# Patient Record
Sex: MALE | Race: OTHER | HISPANIC OR LATINO | URBAN - METROPOLITAN AREA
[De-identification: names, ages, dates, MRNs, and addresses within clinical notes are randomized per-mention and may not be internally consistent; named-entity substitution may affect disease eponyms.]

---

## 2024-05-19 ENCOUNTER — EMERGENCY (EMERGENCY)
Facility: HOSPITAL | Age: 39
LOS: 1 days | Discharge: HOME | End: 2024-05-19
Admitting: EMERGENCY MEDICINE
Payer: SELF-PAY

## 2024-05-19 VITALS
OXYGEN SATURATION: 99 % | RESPIRATION RATE: 17 BRPM | DIASTOLIC BLOOD PRESSURE: 84 MMHG | SYSTOLIC BLOOD PRESSURE: 124 MMHG | TEMPERATURE: 98 F | HEART RATE: 59 BPM | HEIGHT: 64 IN | WEIGHT: 176.37 LBS
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
## 2024-05-19 DIAGNOSIS — S42.021A DISPLACED FRACTURE OF SHAFT OF RIGHT CLAVICLE, INITIAL ENCOUNTER FOR CLOSED FRACTURE: ICD-10-CM

## 2024-05-19 DIAGNOSIS — S22.41XA MULTIPLE FRACTURES OF RIBS, RIGHT SIDE, INITIAL ENCOUNTER FOR CLOSED FRACTURE: ICD-10-CM

## 2024-05-19 DIAGNOSIS — R07.81 PLEURODYNIA: ICD-10-CM

## 2024-05-19 PROCEDURE — 73000 X-RAY EXAM OF COLLAR BONE: CPT | Mod: 26,RT

## 2024-05-19 PROCEDURE — 71250 CT THORAX DX C-: CPT | Mod: 26,MC

## 2024-05-19 PROCEDURE — 73030 X-RAY EXAM OF SHOULDER: CPT | Mod: 26,RT

## 2024-05-19 PROCEDURE — 99285 EMERGENCY DEPT VISIT HI MDM: CPT

## 2024-05-19 RX ORDER — ACETAMINOPHEN 500 MG
650 TABLET ORAL ONCE
Refills: 0 | Status: COMPLETED | OUTPATIENT
Start: 2024-05-19 | End: 2024-05-19

## 2024-05-19 RX ORDER — KETOROLAC TROMETHAMINE 30 MG/ML
15 SYRINGE (ML) INJECTION ONCE
Refills: 0 | Status: DISCONTINUED | OUTPATIENT
Start: 2024-05-19 | End: 2024-05-19

## 2024-05-19 RX ORDER — LIDOCAINE 4 G/100G
1 CREAM TOPICAL ONCE
Refills: 0 | Status: COMPLETED | OUTPATIENT
Start: 2024-05-19 | End: 2024-05-19

## 2024-05-19 RX ADMIN — Medication 650 MILLIGRAM(S): at 19:44

## 2024-05-19 RX ADMIN — LIDOCAINE 1 PATCH: 4 CREAM TOPICAL at 19:44

## 2024-05-19 RX ADMIN — Medication 15 MILLIGRAM(S): at 19:44
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
## 2024-05-19 NOTE — ED PROVIDER NOTE - PHYSICAL EXAMINATION
· CONSTITUTIONAL: Well appearing, well nourished, awake, alert, oriented to person, place, time/situation and in no apparent distress.  · HEAD: Head atraumatic, normal cephalic shape.  · HEAD: Head is atraumatic. Head shape is symmetrical.  · CARDIAC: Normal rate, regular rhythm.  Heart sounds S1, S2.  No murmurs, rubs or gallops.  · RESPIRATORY: Breath sounds clear and equal bilaterally. +TTP Rt anterolateral rib, no deformities or signs of trauma.   · GASTROINTESTINAL: Abdomen soft, non-tender, no guarding.  · MUSCULOSKELETAL: Tenderness to palpation along the right mid clavicle.  Full range of motion of right shoulder.  Full range of motion of bilateral upper extremity, radial pulses 2+, sensation tact light touch, motion 5 out of 5 bilateral upper extremities.  · NEUROLOGICAL: Alert and oriented, no focal deficits, no motor or sensory deficits.  · SKIN: Skin normal color for race, warm, dry and intact. No evidence of rash.  · PSYCHIATRIC: Alert and oriented to person, place, time/situation. normal mood and affect. no apparent risk to self or others. · CONSTITUTIONAL: Well appearing, well nourished, awake, alert, oriented to person, place, time/situation and in no apparent distress.  · HEAD: Head atraumatic, normal cephalic shape.  · HEAD: Head is atraumatic. Head shape is symmetrical.  · CARDIAC: Normal rate, regular rhythm.  Heart sounds S1, S2.  No murmurs, rubs or gallops.  · RESPIRATORY: Breath sounds clear and equal bilaterally. +TTP Rt anterolateral rib, no deformities or signs of trauma.   · GASTROINTESTINAL: Abdomen soft, non-tender, no guarding.  · MUSCULOSKELETAL: Tenderness to palpation along the right mid clavicle.  Full range of motion of right shoulder.  Full range of motion of bilateral upper extremity, radial pulses 2+, sensation tact light touch, motion 5 out of 5 bilateral upper extremities. no e/o skin tenting, cap refill along clavicle is <3 sec.   · NEUROLOGICAL: Alert and oriented, no focal deficits, no motor or sensory deficits.  · SKIN: Skin normal color for race, warm, dry and intact. No evidence of rash.  · PSYCHIATRIC: Alert and oriented to person, place, time/situation. normal mood and affect. no apparent risk to self or others.
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
## 2024-05-19 NOTE — ED PROVIDER NOTE - NS ED ROS FT
· CONSTITUTIONAL: no fever and no chills.  · CARDIOVASCULAR: normal rate and rhythm, no chest pain and no edema.  · RESPIRATORY: no chest pain, no cough, and no shortness of breath. +RIB PAIN  · GASTROINTESTINAL: no abdominal pain, no bloating, no constipation, no diarrhea, no nausea and no vomiting.  · MUSCULOSKELETAL: no back pain, + musculoskeletal pain, no neck pain, and no weakness.  · SKIN: no abrasions, no jaundice, no lesions, no pruritis, and no rashes.  · NEURO: no loss of consciousness, no gait abnormality, no headache, no sensory deficits, and no weakness.  · PSYCHIATRIC: no known mental health issues.

## 2024-05-19 NOTE — ED PROVIDER NOTE - NSFOLLOWUPINSTRUCTIONS_ED_ALL_ED_FT
Fractura de clavícula  Clavicle Fracture  The upper body, showing a clavicle fracture.  Aaron fractura de clavícula es aaron ruptura del hueso wesley que conecta el hombro con la pared torácica (clavícula). La clavícula es un hueso con forma de llave o clavija. La fractura de clavícula es aaron lesión frecuente que puede suceder a cualquier edad.    ¿Cuáles son las causas?  Las causas más frecuentes de la fractura de clavícula incluyen:  Un golpe arthur y directo en el hombro.  Un accidente automovilístico.  Aaron caída, en especial si intenta amortiguarla con el brazo extendido.  ¿Qué incrementa el riesgo?  Es más probable que tenga esta afección si:  Es mary de 25 años o mayor de 75 años. La mayoría de las fracturas de clavícula se presentan en personas menores de 25 años.  Es varón.  Practica deportes de contacto.  ¿Cuáles son los signos o síntomas?  Los síntomas de esta afección incluyen:  Dolor cerca de la clavícula lesionada y en el hombro o el brazo del lado lesionado.  Problemas para  el brazo del lado lesionado.  El hombro lesionado cuelga hacia abajo y hacia adelante.  Dolor al intentar levantar el hombro del lado lesionado.  Moretones, hinchazón y dolor con la palpación en la clavícula lesionada.  Chirrido al intentar  el hombro del lado lesionado.  Aaron protuberancia en la clavícula lesionada.  ¿Cómo se diagnostica?  Esta afección se diagnostica en función de lo siguiente:  Papo antecedentes médicos.  Un examen físico.  Radiografías para determinar la posición de la clavícula lesionada.  ¿Cómo se trata?  El tratamiento para esta afección depende de la posición de la clavícula después de la fractura:  Si los extremos fracturados del hueso no están fuera de lugar, el médico puede colocarle el brazo en un cabestrillo.  Si los extremos fracturados del hueso están fuera de lugar, puede necesitar aaron cirugía. [place] puede incluir colocar tornillos, clavos o placas para estabilizar la clavícula mientras se consolida.  Pueden administrarle analgésicos.  El médico puede recetarle un dispositivo ortopédico (laura para clavícula) que envuelve los hombros y la parte superior de la espalda para evitar que la clavícula lesionada se mueva mientras se scott.  Cuando la fractura se haya curado, es posible que deba hacer ejercicios de fisioterapia para mejorar el movimiento y la fuerza del hombro y el brazo.    Siga estas instrucciones en godinez casa:  Medicamentos    Pregúntele al médico si el medicamento que le recetó:  Requiere que evite conducir o usar maquinaria.  Puede causarle estreñimiento. Es posible que tenga que meredith estas medidas para prevenir o tratar el estreñimiento:  Beber suficiente líquido para mantener el pis (orina) de color amarillo pálido.  Usar medicamentos recetados o de venta alyssa.  Consumir alimentos ricos en fibra, theo frijoles, cereales integrales, y frutas y verduras frescas.  Limitar el consumo de alimentos ricos en grasa y azúcares procesados, theo los alimentos fritos o dulces.  Si tiene un cabestrillo o un dispositivo ortopédico extraíbles:    Use el cabestrillo o el dispositivo ortopédico theo se lo haya indicado el médico. Quíteselo solamente theo se lo haya indicado el médico.  Controle todos los stepan la piel alrededor del cabestrillo o dispositivo ortopédico. Informe al médico acerca de cualquier inquietud.  Afloje el cabestrillo o el dispositivo ortopédico si siente hormigueo en los dedos de la mano, o si se le entumecen o se le enfrían y se ponen de color dedrick.  Mantenga limpio y seco el cabestrillo o el dispositivo ortopédico.  Pregúntele al médico si puede quitarse el cabestrillo o el dispositivo ortopédico cuando se baña o se ducha.  Si necesita usar el cabestrillo o el dispositivo ortopédico mientras se baña, y el cabestrillo o el dispositivo ortopédico no son impermeables:  No deje que se mojen.  Cúbralos con un envoltorio hermético cuando tome un baño de inmersión o aaron ducha.  Si puede quitarse el cabestrillo o el dispositivo médico cuando luther un baño o aaron ducha, mantenga el hombro en la misma posición que cuando lo tiene puesto.  Control del dolor, la rigidez y la hinchazón    A bag of ice on a towel on the skin.  Aplique hielo sobre la geraldine lesionada si se lo indican.  Si tiene un cabestrillo o dispositivo ortopédico desmontables, quíteselos theo se lo haya indicado el médico.  Ponga el hielo en aaron bolsa plástica.  Coloque aaron toalla entre la piel y la bolsa.  Aplique el hielo mariano 20 minutos, 2 a 3 veces por día.  Si la piel se le pone de color martin brillante, retire el hielo de inmediato para evitar daños en la piel. El riesgo de daño es mayor si no puede sentir dolor, calor o frío.  Mueva los dedos de la mano con frecuencia para reducir la rigidez y la hinchazón.  Actividad    Evite las actividades que empeoran los síntomas mariano 4 a 6 semanas o mariano el tiempo que le hayan indicado.  Es posible que deba evitar levantar objetos. Pregúntele al médico cuánto peso puede levantar sin correr riesgos.  No coloque peso en el brazo del lado lesionado, theo empujar el brazo hacia abajo, hasta que el médico le diga que es seguro.  No apoye el peso del cuerpo sobre la extremidad lesionada hasta que lo autorice el médico.  Pregúntele al médico cuándo es seguro volver a conducir.  Robin los ejercicios theo se lo haya indicado el médico.  Retome papo actividades normales theo se lo haya indicado el médico. Pregúntele al médico qué actividades son seguras para usted.  Instrucciones generales    No consuma ningún producto que contenga nicotina o tabaco. Estos productos incluyen cigarrillos, tabaco para mascar y aparatos de vapeo, theo los cigarrillos electrónicos. Estos pueden retrasar la consolidación del hueso. Si necesita ayuda para dejar de fumar, consulte al médico.  Use los medicamentos de venta alyssa y los recetados solo theo se lo haya indicado el médico.  Comuníquese con un médico si:  El medicamento no lo ayuda a aliviar el dolor y la hinchazón.  Solicite ayuda de inmediato si:  El brazo del lado lesionado se le entumece, se pone frío o pálido.  Esta información no tiene theo fin reemplazar el consejo del médico. Asegúrese de hacerle al médico cualquier pregunta que tenga. Fractura de kelsey  Rib Fracture    Aaron fractura de kelsey es la ruptura de jovani de los huesos que la anna. Las costillas son huesos largos y curvos que rodean el pecho y están unidos a la columna vertebral y al esternón. Protegen al corazón, los pulmones y otros órganos que se encuentran en el pecho.    Aaron fractura o fisura de kelsey puede ser dolorosa, pepito no suele ser grave. La mayoría de las fracturas de costillas se curan solas luego de un tiempo. Sin embargo, pueden llegar a ser más graves si se rompen varias costillas o si se desplazan y empujan otras estructuras u órganos.    ¿Cuáles son las causas?  Las causas de esta afección son:  Los movimientos repetitivos que requieren mucha fuerza, theo lanzar aaron pelota en el béisbol o tener episodios de tos muy arthur.  Un golpe directo en el pecho, theo aaron lesión deportiva, un accidente automovilístico o aaron caída.  Cáncer que se extendió a los huesos, lo que puede debilitarlos y provocarles fracturas.  ¿Cuáles son los signos o síntomas?  Los síntomas de esta afección incluyen:  Dolor al inspirar o al toser.  Dolor cuando alguien presiona la geraldine lesionada.  Falta de aire.  ¿Cómo se diagnostica?  Esta afección se diagnostica mediante un examen físico y los antecedentes médicos. Además, pueden hacerle estudios de diagnóstico por imágenes, por ejemplo:  Radiografía de tórax.  Exploración por tomografía computarizada (TC).  Resonancia magnética (RM).  Gammagrafía ósea.  Ecografía de tórax.  ¿Cómo se trata?  El tratamiento de esta afección depende de la gravedad de la fractura. La mayoría de las fracturas de costillas se curan solas en 1 a 3 meses. La curación puede tardar más tiempo si tiene tos o si realiza actividades que empeoran la lesión. Le podrán keiko analgésicos para controlar el dolor mariano el proceso de curación. Le enseñarán a realizar ejercicios de respiración profunda.    En el harjit de las lesiones graves, es posible que deba ser hospitalizado o someterse a aaron cirugía.    Siga estas instrucciones en godinez casa:  Control del dolor, la rigidez y la hinchazón    Si se lo indican, aplique hielo sobre la geraldine de la lesión. Para hacer esto:  Ponga el hielo en aaron bolsa plástica.  Coloque aaron toalla entre la piel y la bolsa.  Aplique el hielo mariano 20 minutos, 2 o 3 veces por día.  Retire el hielo si la piel se pone de color martin brillante. [place] es muy importante. Si no puede sentir dolor, calor o frío, tiene un mayor riesgo de que se dañe la geraldine.  Use los medicamentos de venta alyssa y los recetados solamente theo se lo haya indicado el médico.  Actividad    Evitar realizar actividades o movimientos que causen dolor. Realice las actividades con cuidado y evite golpearse la kelsey lesionada.  Aumente la cantidad de actividades que realice de forma gradual theo se lo haya indicado el médico.  Instrucciones generales    Kaykay ejercicios de respiración profunda theo se lo haya indicado el médico. [place] ayudará a evitar la neumonía, que es aaron complicación frecuente de las fracturas de kelsey. El médico puede indicarle que kaykay lo siguiente:  Kaykay respiraciones profundas varias veces al día.  Trate de toser varias veces al día, con el área lesionada sostenida con aaron almohada.  Use un dispositivo llamado espirómetro de incentivo para realizar respiraciones profundas varias veces por día.  Beber suficiente líquido theo para mantener la orina de color amarillo pálido.  No use cinturones ni sujetadores. Esta limitación de la respiración puede causar neumonía.  Cumpla con todas las visitas de seguimiento. [place] es importante.  Comuníquese con un médico si:  Tiene fiebre.  Solicite ayuda de inmediato si:  Tiene dificultad para respirar o le falta el aire.  Tiene tos permanente o expectora un esputo espeso o con hanny.  Tiene náuseas, vómitos o dolor abdominal.  El dolor empeora y los medicamentos no hacen efecto.  Estos síntomas pueden representar un problema grave que constituye aaron emergencia. No espere a essence si los síntomas desaparecen. Solicite atención médica de inmediato. Comuníquese con el servicio de emergencias de godinez localidad (911 en los [place]). No conduzca por anupama propios medios hasta el hospital.    Resumen  Aaron fractura de kelsey es la ruptura de jovani de los huesos que la anna.  Aaron fractura o fisura de kelsey puede ser dolorosa, pepito no suele ser grave.  La mayoría de las fracturas de costillas se curan solas luego de un tiempo.  El tratamiento de esta afección depende de la gravedad de la fractura.  Evitar realizar cualquier actividad o movimiento que cause dolor.  Esta información no tiene theo fin reemplazar el consejo del médico. Asegúrese de hacerle al médico cualquier pregunta que tenga.    Fractura de clavícula  Clavicle Fracture  The upper body, showing a clavicle fracture.  Aaron fractura de clavícula es aaron ruptura del hueso wesley que conecta el hombro con la pared torácica (clavícula). La clavícula es un hueso con forma de llave o clavija. La fractura de clavícula es aaron lesión frecuente que puede suceder a cualquier edad.    ¿Cuáles son las causas?  Las causas más frecuentes de la fractura de clavícula incluyen:  Un golpe arthur y directo en el hombro.  Un accidente automovilístico.  Aaron caída, en especial si intenta amortiguarla con el brazo extendido.  ¿Qué incrementa el riesgo?  Es más probable que tenga esta afección si:  Es mary de 25 años o mayor de 75 años. La mayoría de las fracturas de clavícula se presentan en personas menores de 25 años.  Es varón.  Practica deportes de contacto.  ¿Cuáles son los signos o síntomas?  Los síntomas de esta afección incluyen:  Dolor cerca de la clavícula lesionada y en el hombro o el brazo del lado lesionado.  Problemas para  el brazo del lado lesionado.  El hombro lesionado cuelga hacia abajo y hacia adelante.  Dolor al intentar levantar el hombro del lado lesionado.  Moretones, hinchazón y dolor con la palpación en la clavícula lesionada.  Chirrido al intentar  el hombro del lado lesionado.  Aaron protuberancia en la clavícula lesionada.  ¿Cómo se diagnostica?  Esta afección se diagnostica en función de lo siguiente:  Anupama antecedentes médicos.  Un examen físico.  Radiografías para determinar la posición de la clavícula lesionada.  ¿Cómo se trata?  El tratamiento para esta afección depende de la posición de la clavícula después de la fractura:  Si los extremos fracturados del hueso no están fuera de lugar, el médico puede colocarle el brazo en un cabestrillo.  Si los extremos fracturados del hueso están fuera de lugar, puede necesitar aaron cirugía. [place] puede incluir colocar tornillos, clavos o placas para estabilizar la clavícula mientras se consolida.  Pueden administrarle analgésicos.  El médico puede recetarle un dispositivo ortopédico (laura para clavícula) que envuelve los hombros y la parte superior de la espalda para evitar que la clavícula lesionada se mueva mientras se scott.  Cuando la fractura se haya curado, es posible que deba hacer ejercicios de fisioterapia para mejorar el movimiento y la fuerza del hombro y el brazo.    Siga estas instrucciones en godinez casa:  Medicamentos    Pregúntele al médico si el medicamento que le recetó:  Requiere que evite conducir o usar maquinaria.  Puede causarle estreñimiento. Es posible que tenga que meredith estas medidas para prevenir o tratar el estreñimiento:  Beber suficiente líquido para mantener el pis (orina) de color amarillo pálido.  Usar medicamentos recetados o de venta alyssa.  Consumir alimentos ricos en fibra, theo frijoles, cereales integrales, y frutas y verduras frescas.  Limitar el consumo de alimentos ricos en grasa y azúcares procesados, theo los alimentos fritos o dulces.  Si tiene un cabestrillo o un dispositivo ortopédico extraíbles:    Use el cabestrillo o el dispositivo ortopédico theo se lo haya indicado el médico. Quíteselo solamente theo se lo haya indicado el médico.  Controle todos los stepan la piel alrededor del cabestrillo o dispositivo ortopédico. Informe al médico acerca de cualquier inquietud.  Afloje el cabestrillo o el dispositivo ortopédico si siente hormigueo en los dedos de la mano, o si se le entumecen o se le enfrían y se ponen de color dedrick.  Mantenga limpio y seco el cabestrillo o el dispositivo ortopédico.  Pregúntele al médico si puede quitarse el cabestrillo o el dispositivo ortopédico cuando se baña o se ducha.  Si necesita usar el cabestrillo o el dispositivo ortopédico mientras se baña, y el cabestrillo o el dispositivo ortopédico no son impermeables:  No deje que se mojen.  Cúbralos con un envoltorio hermético cuando tome un baño de inmersión o aaron ducha.  Si puede quitarse el cabestrillo o el dispositivo médico cuando luther un baño o aaron ducha, mantenga el hombro en la misma posición que cuando lo tiene puesto.  Control del dolor, la rigidez y la hinchazón    A bag of ice on a towel on the skin.  Aplique hielo sobre la geraldnie lesionada si se lo indican.  Si tiene un cabestrillo o dispositivo ortopédico desmontables, quíteselos theo se lo haya indicado el médico.  Ponga el hielo en aaron bolsa plástica.  Coloque aaron toalla entre la piel y la bolsa.  Aplique el hielo mariano 20 minutos, 2 a 3 veces por día.  Si la piel se le pone de color martin brillante, retire el hielo de inmediato para evitar daños en la piel. El riesgo de daño es mayor si no puede sentir dolor, calor o frío.  Mueva los dedos de la mano con frecuencia para reducir la rigidez y la hinchazón.  Actividad    Evite las actividades que empeoran los síntomas mariano 4 a 6 semanas o mariano el tiempo que le hayan indicado.  Es posible que deba evitar levantar objetos. Pregúntele al médico cuánto peso puede levantar sin correr riesgos.  No coloque peso en el brazo del lado lesionado, theo empujar el brazo hacia abajo, hasta que el médico le diga que es seguro.  No apoye el peso del cuerpo sobre la extremidad lesionada hasta que lo autorice el médico.  Pregúntele al médico cuándo es seguro volver a conducir.  [place] los ejercicios theo se lo haya indicado el médico.  Retome anupama actividades normales theo se lo haya indicado el médico. Pregúntele al médico qué actividades son seguras para usted.  Instrucciones generales    No consuma ningún producto que contenga nicotina o tabaco. Estos productos incluyen cigarrillos, tabaco para mascar y aparatos de vapeo, theo los cigarrillos electrónicos. Estos pueden retrasar la consolidación del hueso. Si necesita ayuda para dejar de fumar, consulte al médico.  Use los medicamentos de venta alyssa y los recetados solo theo se lo haya indicado el médico.  Comuníquese con un médico si:  El medicamento no lo ayuda a aliviar el dolor y la hinchazón.  Solicite ayuda de inmediato si:  El brazo del lado lesionado se le entumece, se pone frío o pálido.  Esta información no tiene theo fin reemplazar el consejo del médico. Asegúrese de hacerle al médico cualquier pregunta que tenga.

## 2024-05-19 NOTE — ED PROVIDER NOTE - PATIENT PORTAL LINK FT
You can access the FollowMyHealth Patient Portal offered by [facility] by registering at the following website: [email or web address]. By joining [facility]’s FollowMyHealth portal, you will also be able to view your health information using other applications (apps) compatible with our system.

## 2024-05-19 NOTE — ED PROVIDER NOTE - CARE PLAN
Principal Discharge DX:	Clavicle fracture   1 Principal Discharge DX:	Clavicle fracture  Secondary Diagnosis:	Rib fracture

## 2024-05-19 NOTE — ED PROVIDER NOTE - CARE PROVIDER_API CALL
Vern Fagan  Orthopaedic Surgery  [address]  [place] [address]  Phone: [phone]  Fax: [phone]  Follow Up Time: Urgent

## 2024-05-19 NOTE — ED ADULT NURSE REASSESSMENT NOTE - NS ED NURSE REASSESS COMMENT FT1
Hospital approved  used # [id number]  Fabien applied and IS provided with all education and demonstration given to pt with verbal confirmation and pt demonstration  Pt dc and all education given and gone over with pt. Pt has no further questions and self ambulating out of ed with all belongings and ppwrk. Verbal confirmation that pt understands  All information including refferals, resources, recomendations and  medications (if applicable) gone over with pateint.  Pt not with any current complaints and reports feeling great,   Care complete
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
## 2024-05-20 ENCOUNTER — NON-APPOINTMENT (OUTPATIENT)
Age: 39
End: 2024-05-20

## 2024-05-20 PROBLEM — Z00.00 ENCOUNTER FOR PREVENTIVE HEALTH EXAMINATION: Status: ACTIVE | Noted: 2024-05-20
